# Patient Record
Sex: FEMALE | Race: BLACK OR AFRICAN AMERICAN | NOT HISPANIC OR LATINO | URBAN - METROPOLITAN AREA
[De-identification: names, ages, dates, MRNs, and addresses within clinical notes are randomized per-mention and may not be internally consistent; named-entity substitution may affect disease eponyms.]

---

## 2022-08-07 ENCOUNTER — EMERGENCY (EMERGENCY)
Facility: HOSPITAL | Age: 32
LOS: 1 days | Discharge: HOME | End: 2022-08-07
Attending: EMERGENCY MEDICINE | Admitting: EMERGENCY MEDICINE

## 2022-08-07 VITALS
SYSTOLIC BLOOD PRESSURE: 128 MMHG | TEMPERATURE: 98 F | DIASTOLIC BLOOD PRESSURE: 74 MMHG | RESPIRATION RATE: 18 BRPM | HEART RATE: 85 BPM | OXYGEN SATURATION: 98 %

## 2022-08-07 VITALS
HEART RATE: 70 BPM | DIASTOLIC BLOOD PRESSURE: 62 MMHG | SYSTOLIC BLOOD PRESSURE: 110 MMHG | TEMPERATURE: 98 F | RESPIRATION RATE: 16 BRPM | OXYGEN SATURATION: 100 %

## 2022-08-07 LAB
ALBUMIN SERPL ELPH-MCNC: 4.5 G/DL — SIGNIFICANT CHANGE UP (ref 3.3–5)
ALP SERPL-CCNC: 55 U/L — SIGNIFICANT CHANGE UP (ref 40–120)
ALT FLD-CCNC: 15 U/L — SIGNIFICANT CHANGE UP (ref 4–33)
ANION GAP SERPL CALC-SCNC: 10 MMOL/L — SIGNIFICANT CHANGE UP (ref 7–14)
AST SERPL-CCNC: 16 U/L — SIGNIFICANT CHANGE UP (ref 4–32)
BASOPHILS # BLD AUTO: 0.04 K/UL — SIGNIFICANT CHANGE UP (ref 0–0.2)
BASOPHILS NFR BLD AUTO: 0.7 % — SIGNIFICANT CHANGE UP (ref 0–2)
BILIRUB SERPL-MCNC: 0.4 MG/DL — SIGNIFICANT CHANGE UP (ref 0.2–1.2)
BLD GP AB SCN SERPL QL: NEGATIVE — SIGNIFICANT CHANGE UP
BUN SERPL-MCNC: 8 MG/DL — SIGNIFICANT CHANGE UP (ref 7–23)
CALCIUM SERPL-MCNC: 9.8 MG/DL — SIGNIFICANT CHANGE UP (ref 8.4–10.5)
CHLORIDE SERPL-SCNC: 104 MMOL/L — SIGNIFICANT CHANGE UP (ref 98–107)
CO2 SERPL-SCNC: 25 MMOL/L — SIGNIFICANT CHANGE UP (ref 22–31)
CREAT SERPL-MCNC: 0.73 MG/DL — SIGNIFICANT CHANGE UP (ref 0.5–1.3)
EGFR: 113 ML/MIN/1.73M2 — SIGNIFICANT CHANGE UP
EOSINOPHIL # BLD AUTO: 0.09 K/UL — SIGNIFICANT CHANGE UP (ref 0–0.5)
EOSINOPHIL NFR BLD AUTO: 1.7 % — SIGNIFICANT CHANGE UP (ref 0–6)
GLUCOSE SERPL-MCNC: 80 MG/DL — SIGNIFICANT CHANGE UP (ref 70–99)
HCG SERPL-ACNC: 701.7 MIU/ML — SIGNIFICANT CHANGE UP
HCT VFR BLD CALC: 40.2 % — SIGNIFICANT CHANGE UP (ref 34.5–45)
HGB BLD-MCNC: 13.4 G/DL — SIGNIFICANT CHANGE UP (ref 11.5–15.5)
IANC: 2.84 K/UL — SIGNIFICANT CHANGE UP (ref 1.8–7.4)
IMM GRANULOCYTES NFR BLD AUTO: 0.4 % — SIGNIFICANT CHANGE UP (ref 0–1.5)
LYMPHOCYTES # BLD AUTO: 1.79 K/UL — SIGNIFICANT CHANGE UP (ref 1–3.3)
LYMPHOCYTES # BLD AUTO: 33.1 % — SIGNIFICANT CHANGE UP (ref 13–44)
MCHC RBC-ENTMCNC: 32 PG — SIGNIFICANT CHANGE UP (ref 27–34)
MCHC RBC-ENTMCNC: 33.3 GM/DL — SIGNIFICANT CHANGE UP (ref 32–36)
MCV RBC AUTO: 95.9 FL — SIGNIFICANT CHANGE UP (ref 80–100)
MONOCYTES # BLD AUTO: 0.63 K/UL — SIGNIFICANT CHANGE UP (ref 0–0.9)
MONOCYTES NFR BLD AUTO: 11.6 % — SIGNIFICANT CHANGE UP (ref 2–14)
NEUTROPHILS # BLD AUTO: 2.84 K/UL — SIGNIFICANT CHANGE UP (ref 1.8–7.4)
NEUTROPHILS NFR BLD AUTO: 52.5 % — SIGNIFICANT CHANGE UP (ref 43–77)
NRBC # BLD: 0 /100 WBCS — SIGNIFICANT CHANGE UP
NRBC # FLD: 0 K/UL — SIGNIFICANT CHANGE UP
PLATELET # BLD AUTO: 222 K/UL — SIGNIFICANT CHANGE UP (ref 150–400)
POTASSIUM SERPL-MCNC: 4 MMOL/L — SIGNIFICANT CHANGE UP (ref 3.5–5.3)
POTASSIUM SERPL-SCNC: 4 MMOL/L — SIGNIFICANT CHANGE UP (ref 3.5–5.3)
PROT SERPL-MCNC: 7.8 G/DL — SIGNIFICANT CHANGE UP (ref 6–8.3)
RBC # BLD: 4.19 M/UL — SIGNIFICANT CHANGE UP (ref 3.8–5.2)
RBC # FLD: 11.9 % — SIGNIFICANT CHANGE UP (ref 10.3–14.5)
RH IG SCN BLD-IMP: POSITIVE — SIGNIFICANT CHANGE UP
SODIUM SERPL-SCNC: 139 MMOL/L — SIGNIFICANT CHANGE UP (ref 135–145)
WBC # BLD: 5.41 K/UL — SIGNIFICANT CHANGE UP (ref 3.8–10.5)
WBC # FLD AUTO: 5.41 K/UL — SIGNIFICANT CHANGE UP (ref 3.8–10.5)

## 2022-08-07 PROCEDURE — 99285 EMERGENCY DEPT VISIT HI MDM: CPT

## 2022-08-07 PROCEDURE — 76830 TRANSVAGINAL US NON-OB: CPT | Mod: 26

## 2022-08-07 NOTE — ED PROVIDER NOTE - NSFOLLOWUPINSTRUCTIONS_ED_ALL_ED_FT
Return to the ER in 48 hours on 8/09 to have repeat ultrasound and lab work  Pelvic rest, do not insert anything inside the vagina  Take Tylenol 650mg every 6 hours as needed for pain  Return to the ER sooner with any worsening or concerning symptoms, pelvic pain, heavy vaginal bleeding, weakness, dizziness or any other concerns.

## 2022-08-07 NOTE — ED PROVIDER NOTE - PHYSICAL EXAMINATION
Physical Exam:    Gen: NAD, AOx3, non-toxic appearing, able to ambulate without assistance  Head: NCAT  HEENT: EOMI, PEERLA, normal conjunctiva, tongue midline, oral mucosa moist  Lung: CTAB, no respiratory distress, no wheezes/rhonchi/rales B/L, speaking in full sentences  CV: RRR, no murmurs, rubs or gallops  Abd: soft, NT, ND, no guarding, no rigidity, no rebound tenderness, no CVA tenderness   MSK: no visible deformities, ROM normal in UE/LE, no back pain  : pelvic exam performed with no lesions of the external genitalia, closed cervical os with no active bleeding, no CMT, no adnexal tenderness B/L. Chaperone: ED Anisha Fernando  Neuro: No focal sensory or motor deficits  Skin: Warm, well perfused, no rash, no leg swelling
(4) no impairment

## 2022-08-07 NOTE — CONSULT NOTE ADULT - SUBJECTIVE AND OBJECTIVE BOX
GYN Consult Note    32y  LMP  (5w6d by LMP) presenting with vaginal spotting in setting of +UPT. Spotting x 4 days. Has not gone through panty liner in 1 full day. Denies sob, cp, lightheadedness, dizziness. Reports mild suprapubic discomfort, denies cramping. States sometimes sharp, sometimes dull. Resolves on own, has not required pain meds. +Nausea over last week with episodes of vomiting, not in relation to pain.       OB/GYN HISTORY:   2xNSVD, uncomplicated  1xSAB     Last Menstrual Period     Name of GYN Physician: GYN in [place] (country)     PAST MEDICAL & SURGICAL HISTORY:  denies    Allergies  No Known Allergies      REVIEW OF SYSTEMS  General: denies fevers, chills, tiredness  Skin/Breast: denies breast pain  Respiratory and Thorax: denies shortness of breath, denies cough  Cardiovascular: denies chest pain and denies palpitations  Gastrointestinal: denies abdominal pain, nausea/ vomiting	  Genitourinary: denies dysuria, increased urinary frequency, urgency	  Constitutional, Cardiovascular, Respiratory, Gastrointestinal, Genitourinary, Musculoskeletal and Integumentary review of systems are normal except as noted.   	  Vital Signs Last 24 Hrs  T(C): 36.5 (07 Aug 2022 21:35), Max: 36.7 (07 Aug 2022 16:47)  T(F): 97.7 (07 Aug 2022 21:35), Max: 98 (07 Aug 2022 16:47)  HR: 70 (07 Aug 2022 21:35) (70 - 85)  BP: 110/62 (07 Aug 2022 21:35) (110/62 - 128/74)  BP(mean): --  RR: 16 (07 Aug 2022 21:35) (16 - 18)  SpO2: 100% (07 Aug 2022 21:35) (98% - 100%)    Parameters below as of 07 Aug 2022 21:35  Patient On (Oxygen Delivery Method): room air        PHYSICAL EXAM:   Gen: NAD, alert and oriented x 3  Cardiovascular: regular   Respiratory: breathing comfortably on RA  Abd: soft, non tender, non-distended  Pelvic: closed/long, 10cc in vault, no active bleeding  Extremities: NTBL  Skin: warm and well perfused      LABS:                        13.4   5.41  )-----------( 222      ( 07 Aug 2022 20:10 )             40.2         139  |  104  |  8   ----------------------------<  80  4.0   |  25  |  0.73    Ca    9.8      07 Aug 2022 20:10    TPro  7.8  /  Alb  4.5  /  TBili  0.4  /  DBili  x   /  AST  16  /  ALT  15  /  AlkPhos  55            RADIOLOGY & ADDITIONAL STUDIES:  < from: US Transvaginal (22 @ 21:12) >  FINDINGS:  Uterus: No visualized intrauterine pregnancy. Multiple small cystic foci   seen throughout the endometrium measuringup to 2.5 mm  Endometrium: 14 mm. Within normal limits.    Right ovary: 4.0 cm x 1.6 cm x 2.9 cm. Within normal limits. Normal   arterial and venous waveforms.  Left ovary: 2.2 cm x 1.8 cm x 2.0 cm. Within normal limits. Normal   arterial and venous waveforms.  No visualized adnexal or tubal gestational sac.    Fluid: None.    IMPRESSION:  Pregnancy of unknown location.    Several small cystic foci noted throughout the endometrium, without a   clearly visualized gestational sac. Small cystic focinoted throughout   the endometrium. No clearly visualized gestational sac.    Recommend serial beta hCG and short-term sonographic follow-up.      --- End of Report ---        < end of copied text >

## 2022-08-07 NOTE — CONSULT NOTE ADULT - ASSESSMENT
A/P 32y  LMP  presents with c/o vaginal spotting with +UPT at home. Here with bHCG of 700 and no intra or extrauterine pregnancy noted. Differential includes  ectopic pregnancy vs. early viable IUP vs. spontaneous  in progress.  Difficult to assess at this time.      -patient to follow up in 48 hours for repeat b-HCG in clinic... in the ER  -Rh type: A+. No need for rhogam at this time.   -Ectopic precautions reviewed with patient.  Discussed with patient importance of follow up for b-HCG given unknown pregnancy location at this time.  Patient expressed understanding.  All questions and concerns addressed to patient's apparent satisfaction.   -patient to be added to GYN b-HCG list for closer follow up.    -patient stable for d/c home from GYN perspective.  Primary managment per ED team.      AD[place]ney PGY-4  d/w Dr. Wilder

## 2022-08-07 NOTE — ED PROVIDER NOTE - ATTENDING CONTRIBUTION TO CARE
DR. DE LA FUENTE, ATTENDING MD-  I performed a face to face bedside interview with the patient regarding history of present illness, review of symptoms and past medical history. I completed an independent physical exam.  I have discussed the patient's plan of care with the resident.   Documentation as above in the note.    33 y/o female with pos home pregnancy c/o rlq pain and vag spotting x3 days.  Eval for vag bld in preg of unknown location.  Ectopic, threatened ab.  Obtain cbc cmp hcg tvus t&s.

## 2022-08-07 NOTE — ED PROVIDER NOTE - PROGRESS NOTE DETAILS
Transvaginal U/S shows pregnancy of unknown location. Plan to consult OB. Good Amos, PGY-1  Transvaginal U/S shows pregnancy of unknown location. Plan to consult OB to see patient. SRUTHI Rivers: Pt signed out to me pending OBGYN consult, OB spoke with attg, pt can be discharged, needs to return to the ER in 48 hours for repeat lab work and US. Discussed with pt, she will return sooner with any worsening or concerning symptoms.

## 2022-08-07 NOTE — ED PROVIDER NOTE - NS ED ROS FT
CONSTITUTIONAL: No fevers, no chills, no lightheadedness, no dizziness  EYES: no visual changes, no eye pain  EARS: no ear drainage, no ear pain, no change in hearing  NOSE: no nasal congestion  MOUTH/THROAT: no sore throat  CV: No chest pain, no palpitations  RESP: No SOB, no cough  GI: +ABDOMINAL PAIN, +NAUSEA, +VOMITING  : +VAGINAL BLEEDING, no dysuria, no flank pain  MSK: no back pain, no extremity pain  SKIN: no rashes

## 2022-08-07 NOTE — ED ADULT TRIAGE NOTE - CHIEF COMPLAINT QUOTE
p/t with positive home pregnancy test, c/o of rt lower abd pain and vag spotting for 3 days, no confirmed IUP,

## 2022-08-07 NOTE — ED ADULT NURSE NOTE - OBJECTIVE STATEMENT
pt with co cramps and vaginal spotting x few days. Pt had positive home pregnancy test. iv placed blood collected, pt awaiting dispo.

## 2022-08-07 NOTE — ED PROVIDER NOTE - CLINICAL SUMMARY MEDICAL DECISION MAKING FREE TEXT BOX
33 y/o  F with no PMHx presenting with LLQ and periumbilical abdominal pain with vaginal bleeding    Plan for basic labs, T&S, bhcg, transvaginal U/S.   DDx include r/o ectopic

## 2022-08-07 NOTE — ED PROVIDER NOTE - OBJECTIVE STATEMENT
33 y/o  F with no PMHx presenting with LLQ and periumbilical abdominal pain with vaginal bleeding. Patient states she found out she was pregnant 1 week ago with x4 at home pregnancy tests. States that she noticed some vaginal bleeding/spotting 3 days ago. Attests to two episodes of nonbloody nonbilious vomiting with some nausea. Denies any dysuria, diarrhea/consipation, fever, chills, chest pain, shortness of breath, back pain, neurological complaints.

## 2022-08-09 ENCOUNTER — EMERGENCY (EMERGENCY)
Facility: HOSPITAL | Age: 32
LOS: 1 days | Discharge: HOME | End: 2022-08-09
Admitting: EMERGENCY MEDICINE

## 2022-08-09 VITALS
TEMPERATURE: 98 F | HEART RATE: 68 BPM | SYSTOLIC BLOOD PRESSURE: 111 MMHG | RESPIRATION RATE: 18 BRPM | OXYGEN SATURATION: 100 % | DIASTOLIC BLOOD PRESSURE: 80 MMHG

## 2022-08-09 VITALS
TEMPERATURE: 97 F | OXYGEN SATURATION: 99 % | RESPIRATION RATE: 16 BRPM | HEART RATE: 77 BPM | DIASTOLIC BLOOD PRESSURE: 79 MMHG | SYSTOLIC BLOOD PRESSURE: 131 MMHG

## 2022-08-09 DIAGNOSIS — Z98.890 OTHER SPECIFIED POSTPROCEDURAL STATES: Chronic | ICD-10-CM

## 2022-08-09 LAB — HCG SERPL-ACNC: 270.1 MIU/ML — SIGNIFICANT CHANGE UP

## 2022-08-09 PROCEDURE — 99284 EMERGENCY DEPT VISIT MOD MDM: CPT

## 2022-08-09 NOTE — ED PROVIDER NOTE - NSFOLLOWUPINSTRUCTIONS_ED_ALL_ED_FT
Today you were seen in the ER for vaginal bleeding.     Please see below for a copy of your results.     Advance activity as tolerated.     Continue all previously prescribed medications as directed unless otherwise instructed.      Follow up with your primary care physician and GYN in 48-72 hours- bring copies of your results.      Return to the ER for worsening or persistent symptoms, and/or ANY NEW OR CONCERNING SYMPTOMS including but not limited to heavy vaginal bleeding, worsening abdominal pain, inability to tolerate food/drinks.     If you have issues obtaining follow up, please call: [phone] to obtain a doctor or specialist who takes your insurance in your area.  You may call [phone] to make an appointment with the internal medicine clinic.

## 2022-08-09 NOTE — ED ADULT TRIAGE NOTE - CHIEF COMPLAINT QUOTE
Pt arrives as follow-up for serum HCG. Pt has been having vaginal bleeding and lower abdominal pain, seen here Sunday and was told to follow up today for repeat HCG level. Denies PMHx

## 2022-08-09 NOTE — ED ADULT NURSE NOTE - OBJECTIVE STATEMENT
Patient is a 33 yo female, denies PMH, presenting for hcg check. Patient AAOx4, no signs of distress, reports she had mild vaginal bleeding 5 days ago, took pregnancy tests at home which were positive. Came to ED 2 days ago and had hcg > 700 but no IUP. Patient endorsing mild cramping, bleeding using 1 pad per day, denies nausea, vomiting, fever, chills, urinary symptoms. Labs sent per orders. Fall precautions maintained.

## 2022-08-09 NOTE — CHART NOTE - NSCHARTNOTEFT_GEN_A_CORE
Attempted to call pt to remind to come to ED on 8/10 for follow up bHCG. Primary phone number listed in chart not a working phone number. Alternative number called and was found to be the incorrect number. Unable to reach patient.     Angie Frey, PGY2

## 2022-08-09 NOTE — ED PROVIDER NOTE - OBJECTIVE STATEMENT
32y Female  LMP 22 presents to the ER for repeat HCG. Patient reports vaginal bleeding that began 2 days ago associated with abdominal cramping. Came to ER for evaluation, found to have low beta hcg and pregnancy of unknown location on US. Here today for repeat beta hcg. States that bleeding has increased but denies worsening pain, heavy vaginal bleeding, lightheadedness, dizziness, SOB.

## 2022-08-09 NOTE — ED PROVIDER NOTE - NS ED ROS FT
Constitutional: (-) Fever, (-) Chills  Skin: (-) Rashes  Eyes: (-) Visual changes, (-) Discharge, (-) Redness  Ears: (-) Hearing loss, (-)Tinnitus, (-) Ear pain  Nose: (-) Nasal congestion, (-) Runny nose  Mouth/Throat: (-) Sore throat  CV: (-) Chest pain  Resp: (-) Cough, (-) Shortness of breath, (-) Dyspnea on Exertion, (-) Wheezing  GI: (+) Abdominal pain, (-) Nausea, (-) Vomiting, (-) Diarrhea  : (+)Vaginal bleeding, (-) Increased frequency, (-) Dysuria   MSK: (-) Myalgias, (-)Back pain  Neuro: (-) Headache

## 2022-08-09 NOTE — ED PROVIDER NOTE - CLINICAL SUMMARY MEDICAL DECISION MAKING FREE TEXT BOX
32y Female  LMP 22 presents to the ER for repeat HCG. Reports vaginal bleeding that began 2 days ago associated with abdominal cramping. Came to ER for evaluation, found to have low beta hcg and pregnancy of unknown location on US. Denies worsening pain, heavy vaginal bleeding, lightheadedness, dizziness, SOB. Vital signs stable, abdomen soft, nontender non distended, will repeat hcg, no repeat US per OBGYN.

## 2022-08-09 NOTE — ED PROVIDER NOTE - PATIENT PORTAL LINK FT
You can access the FollowMyHealth Patient Portal offered by [facility] by registering at the following website: [email or web address]. By joining [employer]’s FollowMyHealth portal, you will also be able to view your health information using other applications (apps) compatible with our system.

## 2022-08-10 NOTE — CONSULT NOTE ADULT - ASSESSMENT
A/P 32y  LMP  presents with c/o vaginal spotting with +UPT at home. Here with bHCG of 700->270. Patient with mild cramping and light continued vaginal bleeding, likely spontaneous ab in process.    -patient to follow up in 48 hours for repeat b-HCG in the ER  -Rh type: A+. No need for rhogam at this time.   -Ectopic precautions reviewed with patient.  Discussed with patient importance of follow up for b-HCG given unknown pregnancy location at this time.  Patient expressed understanding.  All questions and concerns addressed to patient's apparent satisfaction.   -patient to be added to GYN b-HCG list for closer follow up.    -patient stable for d/c home from GYN perspective.  Primary management per ED team.      AD[place]ney PGY-4  d/w Dr. Alexis A/P 32y  LMP  presents with c/o vaginal spotting with +UPT at home. Here with bHCG of 700->270. Patient with mild cramping and light continued vaginal bleeding, likely spontaneous ab in process.    -patient to follow up in 48 hours for repeat b-HCG in the ER  -Rh type: A+. No need for rhogam at this time.   -Ectopic precautions reviewed with patient.  Discussed with patient importance of follow up for b-HCG given unknown pregnancy location at this time.  Patient expressed understanding.  All questions and concerns addressed to patient's apparent satisfaction.   -patient to be added to GYN b-HCG list for closer follow up.    -patient stable for d/c home from GYN perspective.  Primary management per ED team.      ADomney PGY-4  d/w Dr. Alexis     addendum pt with PUL here for repeat bhcg.  Pt with vaginal bleeding and declining bhcg. Rh+.  Remains PUL, recommend follow up in 48 hrs.  Vaginal bleeding and ectopic precautions again given.  Pt will remain on GYN bhcg list for close follow up.   Lay Alexis MD

## 2022-08-10 NOTE — CONSULT NOTE ADULT - SUBJECTIVE AND OBJECTIVE BOX
GYN Consult Note    32y  LMP  representing for repeat bHCG. Reports mild cramping with continued light vaginal bleeding. Denies lightheadedness, dizziness, SOB, cp. Not requiring pain medication.       OB/GYN HISTORY:   2xNSVD, uncomplicated  1xSAB     Last Menstrual Period     Name of GYN Physician: GYN in [place] (country)     PAST MEDICAL & SURGICAL HISTORY:  denies    Allergies  No Known Allergies      REVIEW OF SYSTEMS  General: denies fevers, chills, tiredness  Skin/Breast: denies breast pain  Respiratory and Thorax: denies shortness of breath, denies cough  Cardiovascular: denies chest pain and denies palpitations  Gastrointestinal: denies abdominal pain, nausea/ vomiting	  Genitourinary: denies dysuria, increased urinary frequency, urgency	  Constitutional, Cardiovascular, Respiratory, Gastrointestinal, Genitourinary, Musculoskeletal and Integumentary review of systems are normal except as noted.   	  Vital Signs Last 24 Hrs  T(C): 36.5 (07 Aug 2022 21:35), Max: 36.7 (07 Aug 2022 16:47)  T(F): 97.7 (07 Aug 2022 21:35), Max: 98 (07 Aug 2022 16:47)  HR: 70 (07 Aug 2022 21:35) (70 - 85)  BP: 110/62 (07 Aug 2022 21:35) (110/62 - 128/74)  BP(mean): --  RR: 16 (07 Aug 2022 21:35) (16 - 18)  SpO2: 100% (07 Aug 2022 21:35) (98% - 100%)    Parameters below as of 07 Aug 2022 21:35  Patient On (Oxygen Delivery Method): room air        PHYSICAL EXAM:   Gen: NAD, alert and oriented x 3  Cardiovascular: regular   Respiratory: breathing comfortably on RA  Abd: soft, non tender, non-distended  Extremities: NTBL  Skin: warm and well perfused      LABS:             HCG Quantitative, Serum (22 @ 20:10)    HCG Quantitative, Serum: 701.7      HCG Quantitative, Serum (22 @ 19:50)    HCG Quantitative, Serum: 270.1      Type + Screen (22 @ 20:17)    ABO Interpretation: A    Rh Interpretation: Positive    Antibody Screen: Negative

## 2022-08-14 NOTE — CHART NOTE - NSCHARTNOTEFT_GEN_A_CORE
Attempted to call pt to tell her she was due to come into the ED for repeat bHCG on 8/12. Phone number in chart incorrect. Unable to reach patient.     Angie Frey, PGY2

## 2023-06-08 ENCOUNTER — EMERGENCY (EMERGENCY)
Facility: HOSPITAL | Age: 33
LOS: 1 days | Discharge: HOME | End: 2023-06-08
Admitting: STUDENT IN AN ORGANIZED HEALTH CARE EDUCATION/TRAINING PROGRAM
Payer: MEDICAID

## 2023-06-08 VITALS
DIASTOLIC BLOOD PRESSURE: 79 MMHG | HEART RATE: 70 BPM | TEMPERATURE: 98 F | SYSTOLIC BLOOD PRESSURE: 109 MMHG | RESPIRATION RATE: 18 BRPM | OXYGEN SATURATION: 99 %

## 2023-06-08 VITALS
RESPIRATION RATE: 16 BRPM | SYSTOLIC BLOOD PRESSURE: 105 MMHG | DIASTOLIC BLOOD PRESSURE: 66 MMHG | HEART RATE: 56 BPM | OXYGEN SATURATION: 100 % | TEMPERATURE: 99 F

## 2023-06-08 DIAGNOSIS — Z98.890 OTHER SPECIFIED POSTPROCEDURAL STATES: Chronic | ICD-10-CM

## 2023-06-08 LAB
ALBUMIN SERPL ELPH-MCNC: 4.6 G/DL — SIGNIFICANT CHANGE UP (ref 3.3–5)
ALP SERPL-CCNC: 39 U/L — LOW (ref 40–120)
ALT FLD-CCNC: 18 U/L — SIGNIFICANT CHANGE UP (ref 4–33)
ANION GAP SERPL CALC-SCNC: 14 MMOL/L — SIGNIFICANT CHANGE UP (ref 7–14)
APPEARANCE UR: ABNORMAL
AST SERPL-CCNC: 15 U/L — SIGNIFICANT CHANGE UP (ref 4–32)
BACTERIA # UR AUTO: ABNORMAL
BASOPHILS # BLD AUTO: 0.03 K/UL — SIGNIFICANT CHANGE UP (ref 0–0.2)
BASOPHILS NFR BLD AUTO: 0.5 % — SIGNIFICANT CHANGE UP (ref 0–2)
BILIRUB SERPL-MCNC: 0.5 MG/DL — SIGNIFICANT CHANGE UP (ref 0.2–1.2)
BILIRUB UR-MCNC: NEGATIVE — SIGNIFICANT CHANGE UP
BLD GP AB SCN SERPL QL: NEGATIVE — SIGNIFICANT CHANGE UP
BUN SERPL-MCNC: 7 MG/DL — SIGNIFICANT CHANGE UP (ref 7–23)
CALCIUM SERPL-MCNC: 9.5 MG/DL — SIGNIFICANT CHANGE UP (ref 8.4–10.5)
CHLORIDE SERPL-SCNC: 101 MMOL/L — SIGNIFICANT CHANGE UP (ref 98–107)
CO2 SERPL-SCNC: 21 MMOL/L — LOW (ref 22–31)
COLOR SPEC: YELLOW — SIGNIFICANT CHANGE UP
CREAT SERPL-MCNC: 0.6 MG/DL — SIGNIFICANT CHANGE UP (ref 0.5–1.3)
DIFF PNL FLD: ABNORMAL
EGFR: 122 ML/MIN/1.73M2 — SIGNIFICANT CHANGE UP
EOSINOPHIL # BLD AUTO: 0.02 K/UL — SIGNIFICANT CHANGE UP (ref 0–0.5)
EOSINOPHIL NFR BLD AUTO: 0.4 % — SIGNIFICANT CHANGE UP (ref 0–6)
EPI CELLS # UR: >27 /HPF — HIGH (ref 0–5)
GLUCOSE SERPL-MCNC: 75 MG/DL — SIGNIFICANT CHANGE UP (ref 70–99)
GLUCOSE UR QL: NEGATIVE — SIGNIFICANT CHANGE UP
HCG SERPL-ACNC: SIGNIFICANT CHANGE UP MIU/ML
HCT VFR BLD CALC: 36.9 % — SIGNIFICANT CHANGE UP (ref 34.5–45)
HGB BLD-MCNC: 12.6 G/DL — SIGNIFICANT CHANGE UP (ref 11.5–15.5)
HYALINE CASTS # UR AUTO: 46 /LPF — HIGH (ref 0–7)
IANC: 3.24 K/UL — SIGNIFICANT CHANGE UP (ref 1.8–7.4)
IMM GRANULOCYTES NFR BLD AUTO: 0.4 % — SIGNIFICANT CHANGE UP (ref 0–0.9)
KETONES UR-MCNC: ABNORMAL
LEUKOCYTE ESTERASE UR-ACNC: ABNORMAL
LYMPHOCYTES # BLD AUTO: 1.83 K/UL — SIGNIFICANT CHANGE UP (ref 1–3.3)
LYMPHOCYTES # BLD AUTO: 32.6 % — SIGNIFICANT CHANGE UP (ref 13–44)
MCHC RBC-ENTMCNC: 31.7 PG — SIGNIFICANT CHANGE UP (ref 27–34)
MCHC RBC-ENTMCNC: 34.1 GM/DL — SIGNIFICANT CHANGE UP (ref 32–36)
MCV RBC AUTO: 92.9 FL — SIGNIFICANT CHANGE UP (ref 80–100)
MONOCYTES # BLD AUTO: 0.48 K/UL — SIGNIFICANT CHANGE UP (ref 0–0.9)
MONOCYTES NFR BLD AUTO: 8.5 % — SIGNIFICANT CHANGE UP (ref 2–14)
NEUTROPHILS # BLD AUTO: 3.24 K/UL — SIGNIFICANT CHANGE UP (ref 1.8–7.4)
NEUTROPHILS NFR BLD AUTO: 57.6 % — SIGNIFICANT CHANGE UP (ref 43–77)
NITRITE UR-MCNC: NEGATIVE — SIGNIFICANT CHANGE UP
NRBC # BLD: 0 /100 WBCS — SIGNIFICANT CHANGE UP (ref 0–0)
NRBC # FLD: 0 K/UL — SIGNIFICANT CHANGE UP (ref 0–0)
PH UR: 6 — SIGNIFICANT CHANGE UP (ref 5–8)
PLATELET # BLD AUTO: 212 K/UL — SIGNIFICANT CHANGE UP (ref 150–400)
POTASSIUM SERPL-MCNC: 3.3 MMOL/L — LOW (ref 3.5–5.3)
POTASSIUM SERPL-SCNC: 3.3 MMOL/L — LOW (ref 3.5–5.3)
PROT SERPL-MCNC: 7.6 G/DL — SIGNIFICANT CHANGE UP (ref 6–8.3)
PROT UR-MCNC: ABNORMAL
RBC # BLD: 3.97 M/UL — SIGNIFICANT CHANGE UP (ref 3.8–5.2)
RBC # FLD: 10.6 % — SIGNIFICANT CHANGE UP (ref 10.3–14.5)
RBC CASTS # UR COMP ASSIST: 0 /HPF — SIGNIFICANT CHANGE UP (ref 0–4)
RH IG SCN BLD-IMP: POSITIVE — SIGNIFICANT CHANGE UP
SODIUM SERPL-SCNC: 136 MMOL/L — SIGNIFICANT CHANGE UP (ref 135–145)
SP GR SPEC: 1.04 — SIGNIFICANT CHANGE UP (ref 1.01–1.05)
UROBILINOGEN FLD QL: ABNORMAL
WBC # BLD: 5.62 K/UL — SIGNIFICANT CHANGE UP (ref 3.8–10.5)
WBC # FLD AUTO: 5.62 K/UL — SIGNIFICANT CHANGE UP (ref 3.8–10.5)
WBC UR QL: 12 /HPF — HIGH (ref 0–5)

## 2023-06-08 PROCEDURE — 76817 TRANSVAGINAL US OBSTETRIC: CPT | Mod: 26

## 2023-06-08 PROCEDURE — 99284 EMERGENCY DEPT VISIT MOD MDM: CPT

## 2023-06-08 RX ORDER — METOCLOPRAMIDE HCL 10 MG
10 TABLET ORAL ONCE
Refills: 0 | Status: COMPLETED | OUTPATIENT
Start: 2023-06-08 | End: 2023-06-08

## 2023-06-08 RX ORDER — SODIUM CHLORIDE 9 MG/ML
1000 INJECTION, SOLUTION INTRAVENOUS
Refills: 0 | Status: COMPLETED | OUTPATIENT
Start: 2023-06-08 | End: 2023-06-08

## 2023-06-08 RX ADMIN — Medication 10 MILLIGRAM(S): at 20:15

## 2023-06-08 RX ADMIN — SODIUM CHLORIDE 1000 MILLILITER(S): 9 INJECTION, SOLUTION INTRAVENOUS at 20:15

## 2023-06-08 NOTE — ED PROVIDER NOTE - OBJECTIVE STATEMENT
33-year-old female with no known past medical history, LMP 4/18, G5P 2022, presents to the ED complaining of excessive nausea, vomiting epigastric pain and mild pelvic cramping for few weeks.  Patient states she is about 7 weeks pregnant by LMP and for the last few weeks has not been able to tolerate anything by mouth persistent vomiting nonbloody.  Also reports some mild pelvic cramping and epigastric tenderness associated with her vomiting.  Denies any diarrhea constipation fevers chills.  Also denies chest pain, shortness of breath, urinary symptoms, vaginal bleeding, fever, chills,.  Denies any other complaints has not started prenatal care.

## 2023-06-08 NOTE — ED ADULT NURSE NOTE - NSFALLUNIVINTERV_ED_ALL_ED
Bed/Stretcher in lowest position, wheels locked, appropriate side rails in place/Call bell, personal items and telephone in reach/Instruct patient to call for assistance before getting out of bed/chair/stretcher/Non-slip footwear applied when patient is off stretcher/[place] to call system/Physically safe environment - no spills, clutter or unnecessary equipment/Purposeful proactive rounding/Room/bathroom lighting operational, light cord in reach

## 2023-06-08 NOTE — ED PROVIDER NOTE - PATIENT PORTAL LINK FT
You can access the FollowMyHealth Patient Portal offered by [facility] by registering at the following website: [email or web address]. By joining [employer]’s FollowMyHealth portal, you will also be able to view your health information using other applications (apps) compatible with our system.

## 2023-06-08 NOTE — ED ADULT TRIAGE NOTE - CHIEF COMPLAINT QUOTE
pt is about 7 weeks pregnant, LMP 4/18, c/o vomiting, unable to tolerate any PO intake. pt is well appearing. reports vaginal spotting a few days ago.

## 2023-06-08 NOTE — ED PROVIDER NOTE - NSFOLLOWUPCLINICS_GEN_ALL_ED_FT
[facility]  OB/GYN & Surg  [phone] [place] [address]  Phone: [phone]  Fax:   Follow Up Time: 7-10 Days

## 2023-06-08 NOTE — ED ADULT NURSE NOTE - OBJECTIVE STATEMENT
received pt in wellness room 1, 33 yr/o female A+OX4, ambulatory at baseline. pt states she is 7 weeks pregnant no confirmed with US. pt presented to the ED C/O nausea and vomiting for 3 days. states usually vomites more than 15 times daily. pt last vomited while in ED. no blood noted in vomit. denies diarrhea and constipation. abdomen is flat, soft, nontender to palpation. VSS, denies chest pain and SOB, RR even and unlabored. left AC 20g placed, labs drawn and sent. meds given as ordered. partner at bedside as support person. pt is stable at this time.

## 2023-06-08 NOTE — ED PROVIDER NOTE - NSFOLLOWUPINSTRUCTIONS_ED_ALL_ED_FT
Follow up with OB clinic. Call to schedule an appointment    Follow up with your primary care physician in 48-72 hours- bring copies of your results.  Return to the ER for worsening or persistent symptoms, including but not limited to worsening/persistent pain, shortness of breath, fevers, vomiting, lightheadedness, passing out and/or ANY NEW OR CONCERNING SYMPTOMS. If you have issues obtaining follow up, please call: [phone] to obtain a doctor or specialist who takes your insurance in your area.  You may call [phone] to make an appointment with the internal medicine clinic.

## 2023-06-08 NOTE — ED PROVIDER NOTE - CLINICAL SUMMARY MEDICAL DECISION MAKING FREE TEXT BOX
33-year-old female with no known past medical history, LMP 4/18, G5P 2022, presents to the ED complaining of excessive nausea, vomiting epigastric pain and mild pelvic cramping for few weeks.  HD stable. Imp: Hyperemesis Gravidarum, r/o electrolytes imbalance, First trimester pregnancy, r/o ectopic. Plan for labs, antiemetic, IV hydration, TVUS, reassess.

## 2023-06-09 PROBLEM — Z78.9 OTHER SPECIFIED HEALTH STATUS: Chronic | Status: ACTIVE | Noted: 2022-08-09

## 2023-06-14 PROBLEM — Z00.00 ENCOUNTER FOR PREVENTIVE HEALTH EXAMINATION: Status: ACTIVE | Noted: 2023-06-14

## 2023-06-21 ENCOUNTER — RESULT REVIEW (OUTPATIENT)
Age: 33
End: 2023-06-21

## 2023-06-21 ENCOUNTER — OUTPATIENT (OUTPATIENT)
Dept: OUTPATIENT SERVICES | Facility: HOSPITAL | Age: 33
LOS: 1 days | End: 2023-06-21
Payer: MEDICAID

## 2023-06-21 ENCOUNTER — APPOINTMENT (OUTPATIENT)
Dept: OBGYN | Facility: HOSPITAL | Age: 33
End: 2023-06-21

## 2023-06-21 VITALS
WEIGHT: 121 LBS | BODY MASS INDEX: 22.26 KG/M2 | HEIGHT: 62 IN | SYSTOLIC BLOOD PRESSURE: 131 MMHG | HEART RATE: 78 BPM | DIASTOLIC BLOOD PRESSURE: 85 MMHG

## 2023-06-21 VITALS — DIASTOLIC BLOOD PRESSURE: 76 MMHG | SYSTOLIC BLOOD PRESSURE: 116 MMHG

## 2023-06-21 DIAGNOSIS — N96 RECURRENT PREGNANCY LOSS: ICD-10-CM

## 2023-06-21 DIAGNOSIS — Z83.2 FAMILY HISTORY OF DISEASES OF THE BLOOD AND BLOOD-FORMING ORGANS AND CERTAIN DISORDERS INVOLVING THE IMMUNE MECHANISM: ICD-10-CM

## 2023-06-21 DIAGNOSIS — Z98.890 OTHER SPECIFIED POSTPROCEDURAL STATES: Chronic | ICD-10-CM

## 2023-06-21 DIAGNOSIS — O21.9 VOMITING OF PREGNANCY, UNSPECIFIED: ICD-10-CM

## 2023-06-21 DIAGNOSIS — Z34.80 ENCOUNTER FOR SUPERVISION OF OTHER NORMAL PREGNANCY, UNSPECIFIED TRIMESTER: ICD-10-CM

## 2023-06-21 LAB
24R-OH-CALCIDIOL SERPL-MCNC: 22.1 NG/ML — LOW (ref 30–80)
A1C WITH ESTIMATED AVERAGE GLUCOSE RESULT: 4.9 % — SIGNIFICANT CHANGE UP (ref 4–5.6)
ALBUMIN SERPL ELPH-MCNC: 4.5 G/DL — SIGNIFICANT CHANGE UP (ref 3.3–5)
ALP SERPL-CCNC: 40 U/L — SIGNIFICANT CHANGE UP (ref 40–120)
ALT FLD-CCNC: 14 U/L — SIGNIFICANT CHANGE UP (ref 4–33)
ANION GAP SERPL CALC-SCNC: 14 MMOL/L — SIGNIFICANT CHANGE UP (ref 7–14)
APPEARANCE UR: ABNORMAL
AST SERPL-CCNC: 16 U/L — SIGNIFICANT CHANGE UP (ref 4–32)
BACTERIA # UR AUTO: NEGATIVE — SIGNIFICANT CHANGE UP
BILIRUB SERPL-MCNC: 0.3 MG/DL — SIGNIFICANT CHANGE UP (ref 0.2–1.2)
BILIRUB UR-MCNC: NEGATIVE — SIGNIFICANT CHANGE UP
BUN SERPL-MCNC: 7 MG/DL — SIGNIFICANT CHANGE UP (ref 7–23)
CALCIUM SERPL-MCNC: 9.5 MG/DL — SIGNIFICANT CHANGE UP (ref 8.4–10.5)
CHLORIDE SERPL-SCNC: 101 MMOL/L — SIGNIFICANT CHANGE UP (ref 98–107)
CO2 SERPL-SCNC: 22 MMOL/L — SIGNIFICANT CHANGE UP (ref 22–31)
COLOR SPEC: YELLOW — SIGNIFICANT CHANGE UP
COVID-19 SPIKE DOMAIN AB INTERP: POSITIVE
COVID-19 SPIKE DOMAIN ANTIBODY RESULT: >250 U/ML — HIGH
CREAT SERPL-MCNC: 0.53 MG/DL — SIGNIFICANT CHANGE UP (ref 0.5–1.3)
DIFF PNL FLD: NEGATIVE — SIGNIFICANT CHANGE UP
EGFR: 126 ML/MIN/1.73M2 — SIGNIFICANT CHANGE UP
EPI CELLS # UR: 13 /HPF — HIGH (ref 0–5)
ESTIMATED AVERAGE GLUCOSE: 94 — SIGNIFICANT CHANGE UP
GLUCOSE SERPL-MCNC: 72 MG/DL — SIGNIFICANT CHANGE UP (ref 70–99)
GLUCOSE UR QL: NEGATIVE — SIGNIFICANT CHANGE UP
HBV SURFACE AG SER-ACNC: SIGNIFICANT CHANGE UP
HIV 1+2 AB+HIV1 P24 AG SERPL QL IA: SIGNIFICANT CHANGE UP
HYALINE CASTS # UR AUTO: 4 /LPF — SIGNIFICANT CHANGE UP (ref 0–7)
KETONES UR-MCNC: NEGATIVE — SIGNIFICANT CHANGE UP
LEUKOCYTE ESTERASE UR-ACNC: NEGATIVE — SIGNIFICANT CHANGE UP
NITRITE UR-MCNC: NEGATIVE — SIGNIFICANT CHANGE UP
PH UR: 6.5 — SIGNIFICANT CHANGE UP (ref 5–8)
POTASSIUM SERPL-MCNC: 3.7 MMOL/L — SIGNIFICANT CHANGE UP (ref 3.5–5.3)
POTASSIUM SERPL-SCNC: 3.7 MMOL/L — SIGNIFICANT CHANGE UP (ref 3.5–5.3)
PROT SERPL-MCNC: 7 G/DL — SIGNIFICANT CHANGE UP (ref 6–8.3)
PROT UR-MCNC: ABNORMAL
RBC CASTS # UR COMP ASSIST: 6 /HPF — HIGH (ref 0–4)
SARS-COV-2 IGG+IGM SERPL QL IA: >250 U/ML — HIGH
SARS-COV-2 IGG+IGM SERPL QL IA: POSITIVE
SODIUM SERPL-SCNC: 137 MMOL/L — SIGNIFICANT CHANGE UP (ref 135–145)
SP GR SPEC: 1.03 — SIGNIFICANT CHANGE UP (ref 1.01–1.05)
URATE SERPL-MCNC: 1.9 MG/DL — LOW (ref 2.5–7)
UROBILINOGEN FLD QL: SIGNIFICANT CHANGE UP
WBC UR QL: 3 /HPF — SIGNIFICANT CHANGE UP (ref 0–5)

## 2023-06-21 PROCEDURE — G0452: CPT | Mod: 26

## 2023-06-21 RX ORDER — DOXYLAMINE SUCCINATE 25 MG
25 TABLET ORAL
Qty: 1 | Refills: 3 | Status: ACTIVE | COMMUNITY
Start: 2023-06-21 | End: 1900-01-01

## 2023-06-21 RX ORDER — PYRIDOXINE HCL (VITAMIN B6) 25 MG
25 TABLET ORAL
Qty: 90 | Refills: 3 | Status: ACTIVE | COMMUNITY
Start: 2023-06-21 | End: 1900-01-01

## 2023-06-21 RX ORDER — PNV 119/IRON FUM/FOLIC ACID 29 MG-1 MG
TABLET ORAL DAILY
Qty: 30 | Refills: 6 | Status: ACTIVE | COMMUNITY
Start: 2023-06-21

## 2023-06-22 LAB
B2 GLYCOPROT1 AB SER QL: POSITIVE
C TRACH RRNA SPEC QL NAA+PROBE: SIGNIFICANT CHANGE UP
C TRACH+GC RRNA SPEC QL PROBE: SIGNIFICANT CHANGE UP
CARDIOLIPIN AB SER-ACNC: NEGATIVE — SIGNIFICANT CHANGE UP
DRVVT RATIO: 0.94 RATIO — LOW (ref 1–1.19)
DRVVT SCREEN TO CONFIRM RATIO: NEGATIVE — SIGNIFICANT CHANGE UP
GAMMA INTERFERON BACKGROUND BLD IA-ACNC: 0.02 IU/ML — SIGNIFICANT CHANGE UP
HCV RNA FLD QL NAA+PROBE: SIGNIFICANT CHANGE UP
HCV RNA SPEC QL PROBE+SIG AMP: SIGNIFICANT CHANGE UP
HEMOGLOBIN INTERPRETATION: SIGNIFICANT CHANGE UP
HGB A MFR BLD: 96.6 % — SIGNIFICANT CHANGE UP (ref 95–97.6)
HGB A2 MFR BLD: 3.1 % — SIGNIFICANT CHANGE UP (ref 2.4–3.5)
HGB F MFR BLD: <1 % — SIGNIFICANT CHANGE UP (ref 0–1.5)
HPV HIGH+LOW RISK DNA PNL CVX: SIGNIFICANT CHANGE UP
LEAD BLD-MCNC: <1 UG/DL — SIGNIFICANT CHANGE UP (ref 0–3.4)
M TB IFN-G BLD-IMP: NEGATIVE — SIGNIFICANT CHANGE UP
M TB IFN-G CD4+ BCKGRND COR BLD-ACNC: 0.02 IU/ML — SIGNIFICANT CHANGE UP
M TB IFN-G CD4+CD8+ BCKGRND COR BLD-ACNC: 0 IU/ML — SIGNIFICANT CHANGE UP
MEV IGG SER-ACNC: 213 AU/ML — SIGNIFICANT CHANGE UP
MEV IGG+IGM SER-IMP: POSITIVE — SIGNIFICANT CHANGE UP
N GONORRHOEA RRNA SPEC QL NAA+PROBE: SIGNIFICANT CHANGE UP
QUANT TB PLUS MITOGEN MINUS NIL: >10 IU/ML — SIGNIFICANT CHANGE UP
RUBV IGG SER-ACNC: 3.6 INDEX — SIGNIFICANT CHANGE UP
RUBV IGG SER-IMP: POSITIVE — SIGNIFICANT CHANGE UP
T PALLIDUM AB TITR SER: NEGATIVE — SIGNIFICANT CHANGE UP
VZV IGG FLD QL IA: <10 INDEX — SIGNIFICANT CHANGE UP
VZV IGG FLD QL IA: NEGATIVE — SIGNIFICANT CHANGE UP

## 2023-06-22 RX ORDER — CHOLECALCIFEROL (VITAMIN D3) 25 MCG
25 MCG TABLET ORAL
Qty: 30 | Refills: 6 | Status: ACTIVE | COMMUNITY
Start: 2023-06-22 | End: 1900-01-01

## 2023-06-23 ENCOUNTER — NON-APPOINTMENT (OUTPATIENT)
Age: 33
End: 2023-06-23

## 2023-06-23 LAB
B2 GLYCOPROT1 IGG SER-ACNC: 119.7 SGU — HIGH
B2 GLYCOPROT1 IGM SER-ACNC: <5 SMU — SIGNIFICANT CHANGE UP

## 2023-06-24 LAB
B2 GLYCOPROT1 IGA SER QL: 99.2 SAU — HIGH
CYTOLOGY SPEC DOC CYTO: SIGNIFICANT CHANGE UP

## 2023-06-25 LAB
CULTURE RESULTS: SIGNIFICANT CHANGE UP
SPECIMEN SOURCE: SIGNIFICANT CHANGE UP

## 2023-06-26 ENCOUNTER — NON-APPOINTMENT (OUTPATIENT)
Age: 33
End: 2023-06-26

## 2023-06-27 LAB — SMA INTERPRETATION: SIGNIFICANT CHANGE UP

## 2023-06-28 LAB — CYSTIC FIBROSIS EXPANDED PANEL: SIGNIFICANT CHANGE UP

## 2023-06-30 ENCOUNTER — NON-APPOINTMENT (OUTPATIENT)
Age: 33
End: 2023-06-30

## 2023-07-03 ENCOUNTER — NON-APPOINTMENT (OUTPATIENT)
Age: 33
End: 2023-07-03

## 2023-07-12 ENCOUNTER — NON-APPOINTMENT (OUTPATIENT)
Age: 33
End: 2023-07-12

## 2023-07-13 ENCOUNTER — ASOB RESULT (OUTPATIENT)
Age: 33
End: 2023-07-13

## 2023-07-13 ENCOUNTER — RESULT REVIEW (OUTPATIENT)
Age: 33
End: 2023-07-13

## 2023-07-13 ENCOUNTER — APPOINTMENT (OUTPATIENT)
Dept: MATERNAL FETAL MEDICINE | Facility: HOSPITAL | Age: 33
End: 2023-07-13
Payer: MEDICAID

## 2023-07-13 ENCOUNTER — OUTPATIENT (OUTPATIENT)
Dept: OUTPATIENT SERVICES | Facility: HOSPITAL | Age: 33
LOS: 1 days | End: 2023-07-13

## 2023-07-13 ENCOUNTER — NON-APPOINTMENT (OUTPATIENT)
Age: 33
End: 2023-07-13

## 2023-07-13 ENCOUNTER — APPOINTMENT (OUTPATIENT)
Dept: OBGYN | Facility: HOSPITAL | Age: 33
End: 2023-07-13
Payer: MEDICAID

## 2023-07-13 VITALS
SYSTOLIC BLOOD PRESSURE: 105 MMHG | HEART RATE: 77 BPM | WEIGHT: 119 LBS | DIASTOLIC BLOOD PRESSURE: 71 MMHG | BODY MASS INDEX: 21.77 KG/M2 | TEMPERATURE: 97.8 F

## 2023-07-13 DIAGNOSIS — Z98.890 OTHER SPECIFIED POSTPROCEDURAL STATES: Chronic | ICD-10-CM

## 2023-07-13 LAB
CREAT ?TM UR-MCNC: 369 MG/DL — SIGNIFICANT CHANGE UP
PROT ?TM UR-MCNC: 46 MG/DL — SIGNIFICANT CHANGE UP
PROT/CREAT UR-RTO: 0.1 RATIO — SIGNIFICANT CHANGE UP (ref 0–0.2)

## 2023-07-13 PROCEDURE — 76801 OB US < 14 WKS SINGLE FETUS: CPT | Mod: 26

## 2023-07-13 PROCEDURE — 76813 OB US NUCHAL MEAS 1 GEST: CPT | Mod: 26

## 2023-07-13 RX ORDER — ASPIRIN 81 MG/1
81 TABLET, COATED ORAL DAILY
Qty: 60 | Refills: 7 | Status: ACTIVE | COMMUNITY
Start: 2023-07-13 | End: 1900-01-01

## 2023-07-14 LAB
GLUCOSE 1H P 50 G GLC PO SERPL-MCNC: 96 MG/DL
GLUCOSE BLDC GLUCOMTR-MCNC: 91

## 2023-07-17 DIAGNOSIS — N96 RECURRENT PREGNANCY LOSS: ICD-10-CM

## 2023-07-17 DIAGNOSIS — R79.89 OTHER SPECIFIED ABNORMAL FINDINGS OF BLOOD CHEMISTRY: ICD-10-CM

## 2023-07-17 DIAGNOSIS — Z00.00 ENCOUNTER FOR GENERAL ADULT MEDICAL EXAMINATION WITHOUT ABNORMAL FINDINGS: ICD-10-CM

## 2023-07-17 DIAGNOSIS — O21.9 VOMITING OF PREGNANCY, UNSPECIFIED: ICD-10-CM

## 2023-07-17 DIAGNOSIS — Z34.80 ENCOUNTER FOR SUPERVISION OF OTHER NORMAL PREGNANCY, UNSPECIFIED TRIMESTER: ICD-10-CM

## 2023-07-17 DIAGNOSIS — R80.9 PROTEINURIA, UNSPECIFIED: ICD-10-CM

## 2023-08-09 ENCOUNTER — APPOINTMENT (OUTPATIENT)
Dept: OBGYN | Facility: HOSPITAL | Age: 33
End: 2023-08-09

## 2023-08-09 ENCOUNTER — OUTPATIENT (OUTPATIENT)
Dept: OUTPATIENT SERVICES | Facility: HOSPITAL | Age: 33
LOS: 1 days | End: 2023-08-09

## 2023-08-09 ENCOUNTER — RESULT REVIEW (OUTPATIENT)
Age: 33
End: 2023-08-09

## 2023-08-09 VITALS
TEMPERATURE: 97.8 F | BODY MASS INDEX: 22.86 KG/M2 | HEART RATE: 82 BPM | SYSTOLIC BLOOD PRESSURE: 111 MMHG | WEIGHT: 125 LBS | DIASTOLIC BLOOD PRESSURE: 69 MMHG

## 2023-08-09 DIAGNOSIS — O21.9 VOMITING OF PREGNANCY, UNSPECIFIED: ICD-10-CM

## 2023-08-09 DIAGNOSIS — R80.9 PROTEINURIA, UNSPECIFIED: ICD-10-CM

## 2023-08-09 DIAGNOSIS — Z34.80 ENCOUNTER FOR SUPERVISION OF OTHER NORMAL PREGNANCY, UNSPECIFIED TRIMESTER: ICD-10-CM

## 2023-08-09 DIAGNOSIS — R79.89 OTHER SPECIFIED ABNORMAL FINDINGS OF BLOOD CHEMISTRY: ICD-10-CM

## 2023-08-09 DIAGNOSIS — Z98.890 OTHER SPECIFIED POSTPROCEDURAL STATES: Chronic | ICD-10-CM

## 2023-08-15 LAB
AFP ADJ MOM SERPL: 1.06 — SIGNIFICANT CHANGE UP
AFP INTERP SERPL-IMP: SIGNIFICANT CHANGE UP
AFP INTERP SERPL-IMP: SIGNIFICANT CHANGE UP
AFP SERPL-MCNC: 45.9 NG/ML — SIGNIFICANT CHANGE UP
AGE AT DELIVERY: 32.8 YR — SIGNIFICANT CHANGE UP
ALPHA FETOPROTEIN SERUM COMMENT: SIGNIFICANT CHANGE UP
ALPHA FETOPROTEIN SERUM RESULTS: SIGNIFICANT CHANGE UP
GA METHOD: SIGNIFICANT CHANGE UP
GA: 16.1 WEEKS — SIGNIFICANT CHANGE UP
IDDM PATIENT QL: NO — SIGNIFICANT CHANGE UP
MULTIPLE PREGNANCY: NO — SIGNIFICANT CHANGE UP
NEURAL TUBE DEFECT RISK FETUS: SIGNIFICANT CHANGE UP
RACE AFP: SIGNIFICANT CHANGE UP

## 2023-09-06 ENCOUNTER — NON-APPOINTMENT (OUTPATIENT)
Age: 33
End: 2023-09-06

## 2023-09-06 ENCOUNTER — RESULT CHARGE (OUTPATIENT)
Age: 33
End: 2023-09-06

## 2023-09-07 ENCOUNTER — APPOINTMENT (OUTPATIENT)
Dept: OBGYN | Facility: HOSPITAL | Age: 33
End: 2023-09-07

## 2023-09-07 ENCOUNTER — OUTPATIENT (OUTPATIENT)
Dept: OUTPATIENT SERVICES | Facility: HOSPITAL | Age: 33
LOS: 1 days | End: 2023-09-07

## 2023-09-07 ENCOUNTER — APPOINTMENT (OUTPATIENT)
Dept: MATERNAL FETAL MEDICINE | Facility: HOSPITAL | Age: 33
End: 2023-09-07
Payer: MEDICAID

## 2023-09-07 ENCOUNTER — ASOB RESULT (OUTPATIENT)
Age: 33
End: 2023-09-07

## 2023-09-07 ENCOUNTER — NON-APPOINTMENT (OUTPATIENT)
Age: 33
End: 2023-09-07

## 2023-09-07 VITALS
WEIGHT: 131 LBS | TEMPERATURE: 97.8 F | DIASTOLIC BLOOD PRESSURE: 78 MMHG | BODY MASS INDEX: 23.96 KG/M2 | SYSTOLIC BLOOD PRESSURE: 106 MMHG | HEART RATE: 64 BPM

## 2023-09-07 DIAGNOSIS — Z34.80 ENCOUNTER FOR SUPERVISION OF OTHER NORMAL PREGNANCY, UNSPECIFIED TRIMESTER: ICD-10-CM

## 2023-09-07 DIAGNOSIS — N96 RECURRENT PREGNANCY LOSS: ICD-10-CM

## 2023-09-07 DIAGNOSIS — R79.89 OTHER SPECIFIED ABNORMAL FINDINGS OF BLOOD CHEMISTRY: ICD-10-CM

## 2023-09-07 DIAGNOSIS — N88.3 INCOMPETENCE OF CERVIX UTERI: ICD-10-CM

## 2023-09-07 PROCEDURE — 99203 OFFICE O/P NEW LOW 30 MIN: CPT | Mod: 25

## 2023-09-07 PROCEDURE — 76811 OB US DETAILED SNGL FETUS: CPT | Mod: 26

## 2023-09-07 PROCEDURE — 76817 TRANSVAGINAL US OBSTETRIC: CPT | Mod: 26

## 2023-09-07 RX ORDER — PROGESTERONE 200 MG/1
200 CAPSULE ORAL
Qty: 30 | Refills: 6 | Status: ACTIVE | COMMUNITY
Start: 2023-09-07 | End: 1900-01-01

## 2023-09-08 DIAGNOSIS — N88.3 INCOMPETENCE OF CERVIX UTERI: ICD-10-CM

## 2023-09-08 DIAGNOSIS — Z34.80 ENCOUNTER FOR SUPERVISION OF OTHER NORMAL PREGNANCY, UNSPECIFIED TRIMESTER: ICD-10-CM

## 2023-09-08 DIAGNOSIS — R79.89 OTHER SPECIFIED ABNORMAL FINDINGS OF BLOOD CHEMISTRY: ICD-10-CM

## 2023-09-08 DIAGNOSIS — N96 RECURRENT PREGNANCY LOSS: ICD-10-CM

## 2023-09-18 ENCOUNTER — APPOINTMENT (OUTPATIENT)
Dept: MATERNAL FETAL MEDICINE | Facility: HOSPITAL | Age: 33
End: 2023-09-18

## 2023-09-20 ENCOUNTER — APPOINTMENT (OUTPATIENT)
Dept: OBGYN | Facility: HOSPITAL | Age: 33
End: 2023-09-20